# Patient Record
(demographics unavailable — no encounter records)

---

## 2024-11-15 NOTE — REASON FOR VISIT
Reason For Visit  YASMIN ORTIZ is an established patient here today for an annual physical. developed nasal drainge and sore throat, starting yesterday at 3pm, subhash out of atorvastin and losartan one week ago.          Quality    Adult Wellness CI height documented, discussion of regular exercise, exercising regularly, printed information given for activities, discussion of nutritional quality of diet, patient education given about proper diet, not using alcohol, no tobacco use, does not have feelings of hopelessness (PHQ-2) and no Anhedonia (PHQ-2).   Diabetes CI height documented, no tobacco use, does not have feelings of hopelessness (PHQ-2), no anhedonia (PHQ-2), a foot inspection performed, right foot was examined - Pedal Pulse, left foot was examined - Pedal Pulse, Monofilament Wire Test of the right foot was performed and Monofilament Wire Test of the left foot was performed.      History of Present Illness  Patient continues to see endocrinologist Last visit was in July.Does not remember hgb a1c   Refuses flu vaccine  He is compliant with c pap. illness since last visit Symptoms include nasal discharge, but no fever, no chills, no nasal passage blockage, no cough, no wheezing, no dyspnea, no shortness of breath, no hoarseness, no sore throat and no headache.   Hypertension: The patient is currently asymptomatic. Recent blood pressure has been mostly > 130/80. Exacerbating factors: weight gain. No associated symptoms are reported.   Current Treatment: See medication list, verified with the patient/family/guardian.      Review of Systems    Const: Normal.   CV: Normal.   Resp: Normal.   GI: Normal.   : erectile dysfunction.   Neuro: Normal.   Psych: Normal.   Skin: Normal.       Allergies  No Known Drug Allergies    Current Meds   1. Atorvastatin Calcium 40 MG Oral Tablet; take one tablet by mouth every day;   Therapy: 11Jun2015 to (Last Rx:11Aug2017)  Requested for: 11Aug2017; Status: ACTIVE   - Transmit to  Pharmacy - Awaiting Verification Ordered   2. BD Pen Needle Mini U/F 31G X 5 MM; use 4 times a day with humalog and lantus;   Therapy: 10Apr2015 to (Evaluate:13Nov2018)  Requested for: 19Oct2018; Last   Rx:19Oct2018 Ordered   3. Cialis 20 MG Oral Tablet; TAKE 1 TABLET 1 HOUR BEFORE ACTIVITY AS NEEDED;   Therapy: 11Aug2017 to (Last Rx:11Aug2017) Ordered   4. Lantus SoloStar 100 UNIT/ML Subcutaneous Solution Pen-injector; inject 70 units   subcutaneously 2 times a day in the morning and evening;   Therapy: 12Jan2015 to (Evaluate:[id number])  Requested for: 07May2018; Last   Rx:07May2018 Ordered   5. Losartan Potassium-HCTZ 100-12.5 MG Oral Tablet; take one tablet by mouth one time   daily;   Therapy: 11Jun2015 to (Evaluate:[id number])  Requested for: 10Fey0605; Last   Rx:[id number] Ordered   6. MetFORMIN HCl  MG Oral Tablet Extended Release 24 Hour; TAKE 4 TABLETS   BY MOUTH NIGHTLY;   Therapy: 15Sep2014 to (Evaluate:[id number])  Requested for: 25Jun2018; Last   Rx:25Jun2018 Ordered   7. NovoLOG FlexPen 100 UNIT/ML Subcutaneous Solution Pen-injector; INJECT 30 UNIT   After meals;   Therapy: 10Mar2017 to (Last Rx:10Mar2017)  Requested for: 10Mar2017 Ordered   8. Victoza 18 MG/3ML SOLN;   Therapy: (Recorded:23Oct2018) to Recorded    Active Problems  Diabetes mellitus (E11.9)  Hypercholesteremia (E78.00)  Hypertension (I10)  Morbid obesity with alveolar hypoventilation (E66.2)  Obstructive sleep apnea (G47.33)  Psoriasis (L40.9)  Uncontrolled diabetes mellitus with kidney complication, with long-term current use of  insulin (E11.29,E11.65,Z79.4)    Review  Past medical history, problem list, family medical history, surgical history and social history reviewed.      Vitals  Signs   Recorded: 23Oct2018 09:32AM   Height: 5 ft 10 in  Weight: 243 lb 8 oz  BMI Calculated: 34.94  BSA Calculated: 2.27  Systolic: 160  Diastolic: 80  Heart Rate: 80    Physical Exam  Constitutional: alert, in no acute distress and current vital  signs reviewed.   Head and Face: atraumatic, no deformities, normocephalic, normal facies. no tenderness of facial sinuses.   ENT: . There was clear rhinorrhea from both nares. The bilateral nasal mucosa was boggy. oral mucosa pink and moist, no oral lesions, tonsils not enlarged, normal appearing pharynx, normal appearing tongue.   Neck: normal appearing neck, supple neck and no mass was seen. thyroid not enlarged and no thyroid nodules.   Lymphatic: no lymphadenopathy.   Pulmonary: no respiratory distress, normal respiratory rate and effort and no accessory muscle use. breath sounds clear to auscultation bilaterally.   Cardiovascular: normal rate, no murmurs were heard, regular rhythm, normal S1 and normal S2. edema was not present in the lower extremities.   Abdomen: soft, nontender, nondistended, normal bowel sounds and no abdominal mass. no hepatomegaly and no splenomegaly. no umbilical hernia was discovered.   Musculoskeletal: normal gait. no musculoskeletal erythema was seen, no joint swelling seen and no joint tenderness was elicited. no scoliosis. normal range of motion. there was no joint instability noted. muscle strength and tone were normal.   Neurologic: a diabetic neuropathy was noted.   Psychiatric: oriented to person, oriented to place and oriented to time. alert and awake, interactive and mood/affect were appropriate. judgement not impaired. normal attention span. short term memory intact.   Skin, Hair, Nails: normal skin color and pigmentation and no rash. no foot ulcers and no skin ulcer was seen. normal skin turgor. no clubbing or cyanosis of the fingernails.      Immunizations  Tdap --- Series1: 16-May-2007  (45y); Series2: 11-Aug-2017  (55y)     Assessment  Encounter for preventive health examination (Z00.00)  Hypercholesteremia (E78.00)  Hypertension (I10)  Uncontrolled diabetes mellitus with kidney complication, with long-term current use of  insulin (E11.29,E11.65,Z79.4)  Morbid obesity  with alveolar hypoventilation (E66.2)  Obstructive sleep apnea (G47.33)  Type 2 diabetes mellitus with diabetic neuropathy, with long-term current use of insulin  (E11.40,Z79.4)    Plan  AmLODIPine Besylate 5 MG Oral Tablet; 1 po qd  Atorvastatin Calcium 40 MG Oral Tablet; take one tablet by mouth every day  Losartan Potassium-HCTZ 100-12.5 MG Oral Tablet; take one tablet by mouth  one time daily  CBC WITH AUTOMATED DIFFERENTIAL; Status:Active; Requested for:23Oct2018;   COMP METABOLIC PANEL WITH LIPID PANEL (CPNL,LIPDPL); Status:Active; Requested  for:23Oct2018;   HEMOGLOBIN A1C GLYCOSYLATED; Status:Active; Requested for:23Oct2018;   MICROALBUMIN, URINE; Status:Active; Requested for:23Oct2018;   TSH; Status:Active; Requested for:23Oct2018;   Ophthalmology Referral/Consult Treatment and Evaluation  For: Uncontrolled diabetes  mellitus with kidney complication, with long-term current use of insulin   Status: Active  Requested for: 23Oct2018  Care Summary provided. : Yes  Sleep Consultation Treatment and Evaluation  For: Obstructive sleep apnea  Status:  Active  Requested for: 23Oct2018  Plans:     Plan:   Follow-up in the office in 1 month(s).   Medical compliance with plan discussed and risks of non-compliance reviewed.    Patient education completed on disease process, etiology & prognosis.    Patient expresses understanding of the plan.    Proper usage and side effects of medications reviewed & discussed.    Refer to orders.    Return to clinic as clinically indicated as discussed with patient who verbalized understanding of & agreement with the plan.   Diabetes: A1c goal was not met   Uncontrolled with present regimen.   Complications: neuropathy.   Continued present medications.   BP Goal <130/80: BP goal was not met.   Changed medications - refer to orders.   Continue present medications.   LDL Goal: LDL goal was met.   Continue present medications.    discussed exercise.    discussed weight loss.   Medical  compliance with plan discussed and risks of non-compliance reviewed.    Proper usage and side effects of medications reviewed & discussed.    Take medication as directed.    Patient education completed on disease process, etiology & prognosis.    Return to clinic as clinically indicated as discussed with patient who verbalized understanding of & agreement with the plan.   Recommended Follow Up: Ophthalmology.   Preventive Health Maintenance:   immunizations recommendations reviewed. Refused flu and pneummovax vaccines.   Cancer Screening Recommendations Reviewed: colon and prostate.   preventative diet & exercise related life-style changes discussed & recommended.    injury prevention counseling completed: seatbelts, helmets, smoke detectors, safe storage of fire-arms, internet safety & sun safety discussed .    aspirin recommended.      Signatures   Electronically signed by : Bessie Brizuela L.P.N.; Oct 23 2018  9:32AM CST    Electronically signed by : YORDAN ARCE M.D.; Oct 24 2018  5:58AM CST    Electronically signed by : YORDAN ARCE M.D.; Oct 24 2018  5:59AM CST     [Symptom and Test Evaluation] : symptom and test evaluation [Hyperlipidemia] : hyperlipidemia [Other: ____] : [unfilled]

## 2024-11-15 NOTE — HISTORY OF PRESENT ILLNESS
[FreeTextEntry1] : Denies Chest Pain, SOB, Dizziness, Leg edema, Orthopnea, PND, Palpitations, Syncope, BELLE, Diaphoresis. Patient denies change in appetite, fatigue, heat or cold intolerance, myalgias, polydypsia, polyphagia, polyuria, tingling, numbness.

## 2024-11-15 NOTE — DISCUSSION/SUMMARY
[Hyperlipidemia] : hyperlipidemia [Stable] : stable [None] : There are no changes in medication management [Diet Modification] : diet modification [Exercise] : exercise [Weight Loss] : weight loss [Patient] : the patient [Family] : the patient's family [FreeTextEntry1] : PFO- Stable; no further intervention at this time (see last echo). ecASA to continue. Prediabetes- blood work reviewed with patient. Maintain a low caloric, low sodium, low cholesterol  diet. Dietary counseling given, diet and exercise discussed in detail.

## 2024-11-15 NOTE — PHYSICAL EXAM

## 2025-05-16 NOTE — PHYSICAL EXAM

## 2025-05-16 NOTE — DISCUSSION/SUMMARY
[Hyperlipidemia] : hyperlipidemia [Stable] : stable [None] : There are no changes in medication management [Diet Modification] : diet modification [Exercise] : exercise [Weight Loss] : weight loss [Patient] : the patient [Family] : the patient's family [FreeTextEntry1] : Cardiac murmur- Stable; no further intervention at this time (see last echo) PFO- Stable; no further intervention at this time (see last echo) Prediabetes- blood work reviewed with patient. Maintain a low caloric, low sodium, low cholesterol  diet. Dietary counseling given, diet and exercise discussed in detail, weight loss recommended.